# Patient Record
Sex: FEMALE | Race: WHITE | URBAN - NONMETROPOLITAN AREA
[De-identification: names, ages, dates, MRNs, and addresses within clinical notes are randomized per-mention and may not be internally consistent; named-entity substitution may affect disease eponyms.]

---

## 2017-08-09 ENCOUNTER — HOSPITAL ENCOUNTER (EMERGENCY)
Age: 27
Discharge: HOME | End: 2017-08-09
Attending: FAMILY MEDICINE
Payer: MEDICAID

## 2017-08-09 VITALS
RESPIRATION RATE: 14 BRPM | HEART RATE: 80 BPM | DIASTOLIC BLOOD PRESSURE: 88 MMHG | OXYGEN SATURATION: 98 % | WEIGHT: 121.69 LBS | TEMPERATURE: 98.7 F | SYSTOLIC BLOOD PRESSURE: 119 MMHG

## 2017-08-09 DIAGNOSIS — R11.2 NAUSEA AND VOMITING, INTRACTABILITY OF VOMITING NOT SPECIFIED, UNSPECIFIED VOMITING TYPE: Primary | ICD-10-CM

## 2017-08-09 DIAGNOSIS — R19.7 DIARRHEA, UNSPECIFIED TYPE: ICD-10-CM

## 2017-08-09 LAB
-: ABNORMAL
ABSOLUTE EOS #: 0.1 K/UL (ref 0–0.4)
ABSOLUTE LYMPH #: 1.3 K/UL (ref 1.1–2.7)
ABSOLUTE MONO #: 0.4 K/UL (ref 0–1)
ALBUMIN SERPL-MCNC: 4.2 G/DL (ref 3.5–5.2)
ALBUMIN/GLOBULIN RATIO: ABNORMAL (ref 1–2.5)
ALP BLD-CCNC: 82 U/L (ref 35–104)
ALT SERPL-CCNC: 16 U/L (ref 5–33)
AMORPHOUS: ABNORMAL
AMYLASE: 41 U/L (ref 28–100)
ANION GAP SERPL CALCULATED.3IONS-SCNC: 13 MMOL/L (ref 9–17)
AST SERPL-CCNC: 16 U/L
BACTERIA: ABNORMAL
BASOPHILS # BLD: 0 %
BASOPHILS ABSOLUTE: 0 K/UL (ref 0–0.2)
BILIRUB SERPL-MCNC: 0.79 MG/DL (ref 0.3–1.2)
BILIRUBIN URINE: NEGATIVE
BUN BLDV-MCNC: 12 MG/DL (ref 6–20)
BUN/CREAT BLD: 16 (ref 9–20)
CALCIUM SERPL-MCNC: 8.8 MG/DL (ref 8.6–10.4)
CASTS UA: ABNORMAL /LPF
CHLORIDE BLD-SCNC: 101 MMOL/L (ref 98–107)
CO2: 23 MMOL/L (ref 20–31)
COLOR: ABNORMAL
COMMENT UA: ABNORMAL
CREAT SERPL-MCNC: 0.73 MG/DL (ref 0.5–0.9)
CRYSTALS, UA: ABNORMAL /HPF
DIFFERENTIAL TYPE: YES
EOSINOPHILS RELATIVE PERCENT: 2 %
EPITHELIAL CELLS UA: ABNORMAL /HPF
GFR AFRICAN AMERICAN: >60 ML/MIN
GFR NON-AFRICAN AMERICAN: >60 ML/MIN
GFR SERPL CREATININE-BSD FRML MDRD: ABNORMAL ML/MIN/{1.73_M2}
GFR SERPL CREATININE-BSD FRML MDRD: ABNORMAL ML/MIN/{1.73_M2}
GLUCOSE BLD-MCNC: 225 MG/DL (ref 70–99)
GLUCOSE URINE: ABNORMAL
HCG(URINE) PREGNANCY TEST: NEGATIVE
HCT VFR BLD CALC: 41.1 % (ref 36–46)
HEMOGLOBIN: 14.1 G/DL (ref 12–16)
KETONES, URINE: NEGATIVE
LEUKOCYTE ESTERASE, URINE: NEGATIVE
LIPASE: 17 U/L (ref 13–60)
LYMPHOCYTES # BLD: 21 %
MCH RBC QN AUTO: 31.1 PG (ref 26–34)
MCHC RBC AUTO-ENTMCNC: 34.3 G/DL (ref 31–37)
MCV RBC AUTO: 90.5 FL (ref 80–100)
MONOCYTES # BLD: 6 %
MUCUS: ABNORMAL
NITRITE, URINE: NEGATIVE
OTHER OBSERVATIONS UA: ABNORMAL
PDW BLD-RTO: 12.9 % (ref 12.1–15.2)
PH UA: 6 (ref 5–8)
PLATELET # BLD: 248 K/UL (ref 140–450)
PLATELET ESTIMATE: NORMAL
PMV BLD AUTO: NORMAL FL (ref 6–12)
POTASSIUM SERPL-SCNC: 4.2 MMOL/L (ref 3.7–5.3)
PROTEIN UA: ABNORMAL
RBC # BLD: 4.55 M/UL (ref 4–5.2)
RBC # BLD: NORMAL 10*6/UL
RBC UA: ABNORMAL /HPF (ref 0–2)
RENAL EPITHELIAL, UA: ABNORMAL /HPF
SEG NEUTROPHILS: 71 %
SEGMENTED NEUTROPHILS ABSOLUTE COUNT: 4.6 K/UL (ref 2.5–7)
SODIUM BLD-SCNC: 137 MMOL/L (ref 135–144)
SPECIFIC GRAVITY UA: 1.01 (ref 1–1.03)
TOTAL PROTEIN: 7.2 G/DL (ref 6.4–8.3)
TRICHOMONAS: ABNORMAL
TURBIDITY: ABNORMAL
URINE HGB: NEGATIVE
UROBILINOGEN, URINE: NORMAL
WBC # BLD: 6.5 K/UL (ref 3.5–11)
WBC # BLD: NORMAL 10*3/UL
WBC UA: ABNORMAL /HPF
YEAST: ABNORMAL

## 2017-08-09 PROCEDURE — 2580000003 HC RX 258: Performed by: FAMILY MEDICINE

## 2017-08-09 PROCEDURE — 85025 COMPLETE CBC W/AUTO DIFF WBC: CPT

## 2017-08-09 PROCEDURE — 96374 THER/PROPH/DIAG INJ IV PUSH: CPT

## 2017-08-09 PROCEDURE — 81001 URINALYSIS AUTO W/SCOPE: CPT

## 2017-08-09 PROCEDURE — 6360000002 HC RX W HCPCS: Performed by: FAMILY MEDICINE

## 2017-08-09 PROCEDURE — 80053 COMPREHEN METABOLIC PANEL: CPT

## 2017-08-09 PROCEDURE — 84703 CHORIONIC GONADOTROPIN ASSAY: CPT

## 2017-08-09 PROCEDURE — 99283 EMERGENCY DEPT VISIT LOW MDM: CPT

## 2017-08-09 PROCEDURE — 83690 ASSAY OF LIPASE: CPT

## 2017-08-09 PROCEDURE — 82150 ASSAY OF AMYLASE: CPT

## 2017-08-09 PROCEDURE — 96361 HYDRATE IV INFUSION ADD-ON: CPT

## 2017-08-09 RX ORDER — LANCETS
1 EACH MISCELLANEOUS PRN
COMMUNITY
Start: 2015-02-04

## 2017-08-09 RX ORDER — LEVOTHYROXINE SODIUM 0.07 MG/1
75 TABLET ORAL DAILY
COMMUNITY

## 2017-08-09 RX ORDER — ELECTROLYTES/DEXTROSE
1 SOLUTION, ORAL ORAL DAILY
COMMUNITY

## 2017-08-09 RX ORDER — BENAZEPRIL HYDROCHLORIDE 5 MG/1
5 TABLET, FILM COATED ORAL DAILY
COMMUNITY
Start: 2016-11-10

## 2017-08-09 RX ORDER — 0.9 % SODIUM CHLORIDE 0.9 %
1000 INTRAVENOUS SOLUTION INTRAVENOUS ONCE
Status: COMPLETED | OUTPATIENT
Start: 2017-08-09 | End: 2017-08-09

## 2017-08-09 RX ORDER — ONDANSETRON 4 MG/1
4 TABLET, ORALLY DISINTEGRATING ORAL EVERY 8 HOURS PRN
Qty: 4 TABLET | Refills: 0 | Status: SHIPPED | OUTPATIENT
Start: 2017-08-09 | End: 2017-12-18 | Stop reason: ALTCHOICE

## 2017-08-09 RX ORDER — SERTRALINE HYDROCHLORIDE 100 MG/1
100 TABLET, FILM COATED ORAL DAILY
COMMUNITY

## 2017-08-09 RX ORDER — GLUCOSAMINE HCL/CHONDROITIN SU 500-400 MG
1 CAPSULE ORAL PRN
COMMUNITY
Start: 2015-02-04

## 2017-08-09 RX ORDER — ONDANSETRON 2 MG/ML
4 INJECTION INTRAMUSCULAR; INTRAVENOUS EVERY 30 MIN PRN
Status: DISCONTINUED | OUTPATIENT
Start: 2017-08-09 | End: 2017-08-09 | Stop reason: HOSPADM

## 2017-08-09 RX ADMIN — SODIUM CHLORIDE 1000 ML: 0.9 INJECTION, SOLUTION INTRAVENOUS at 14:46

## 2017-08-09 RX ADMIN — ONDANSETRON 4 MG: 2 INJECTION INTRAMUSCULAR; INTRAVENOUS at 14:49

## 2017-12-18 ENCOUNTER — HOSPITAL ENCOUNTER (EMERGENCY)
Age: 27
Discharge: HOME | End: 2017-12-18
Attending: FAMILY MEDICINE
Payer: MEDICAID

## 2017-12-18 VITALS
HEART RATE: 87 BPM | OXYGEN SATURATION: 99 % | TEMPERATURE: 100.1 F | RESPIRATION RATE: 20 BRPM | DIASTOLIC BLOOD PRESSURE: 67 MMHG | SYSTOLIC BLOOD PRESSURE: 120 MMHG

## 2017-12-18 DIAGNOSIS — J06.9 VIRAL UPPER RESPIRATORY TRACT INFECTION: Primary | ICD-10-CM

## 2017-12-18 LAB
DIRECT EXAM: NORMAL
Lab: NORMAL
Lab: NORMAL
SPECIMEN DESCRIPTION: NORMAL
SPECIMEN DESCRIPTION: NORMAL
STATUS: NORMAL
STATUS: NORMAL

## 2017-12-18 PROCEDURE — 99283 EMERGENCY DEPT VISIT LOW MDM: CPT

## 2017-12-18 PROCEDURE — 87804 INFLUENZA ASSAY W/OPTIC: CPT

## 2017-12-18 PROCEDURE — 87651 STREP A DNA AMP PROBE: CPT

## 2017-12-18 PROCEDURE — 6370000000 HC RX 637 (ALT 250 FOR IP): Performed by: FAMILY MEDICINE

## 2017-12-18 RX ORDER — BENZONATATE 100 MG/1
100 CAPSULE ORAL ONCE
Status: COMPLETED | OUTPATIENT
Start: 2017-12-18 | End: 2017-12-18

## 2017-12-18 RX ORDER — BENZONATATE 100 MG/1
100 CAPSULE ORAL EVERY 4 HOURS PRN
Qty: 30 CAPSULE | Refills: 0 | Status: SHIPPED | OUTPATIENT
Start: 2017-12-18 | End: 2017-12-25

## 2017-12-18 RX ADMIN — BENZONATATE 100 MG: 100 CAPSULE ORAL at 21:16

## 2017-12-18 ASSESSMENT — PAIN SCALES - GENERAL: PAINLEVEL_OUTOF10: 6

## 2017-12-20 LAB
DIRECT EXAM: NORMAL
DIRECT EXAM: NORMAL
Lab: NORMAL
SPECIMEN DESCRIPTION: NORMAL
SPECIMEN DESCRIPTION: NORMAL
STATUS: NORMAL

## 2017-12-23 NOTE — ED PROVIDER NOTES
eMERGENCY dEPARTMENT eNCOUnter        [address] [facility]    Chief Complaint   Patient presents with    Cough     past 6 days    Fever     started yesterday    Generalized Body Aches       HPI    March Byron is a 28 y.o. female who presents to ED from Home by private vehicle with . She has generalized body aches and a cough and a low-grade fever. She is not coughing up any sputum and she is not short of breath. She has no vomiting or diarrhea. REVIEW OF SYSTEMS    All systems reviewed and positives are in the HPI      PAST MEDICAL HISTORY    Past Medical History:   Diagnosis Date    Diabetes mellitus ([address]     Thyroid disease        SURGICAL HISTORY    History reviewed. No pertinent surgical history. CURRENT MEDICATIONS    Current Outpatient Rx   Medication Sig Dispense Refill    benzonatate (TESSALON PERLES) 100 MG capsule Take 1 capsule by mouth every 4 hours as needed for Cough 30 capsule 0    Insulin Infusion Pump FADI Inject 1 Device into the skin continuous      levothyroxine (SYNTHROID) 75 MCG tablet Take 75 mcg by mouth daily      Multiple Vitamins-Minerals (MULTIVITAMIN ADULT) TABS Take 1 tablet by mouth daily      sertraline (ZOLOFT) 100 MG tablet Take 100 mg by mouth daily      benazepril (LOTENSIN) 5 MG tablet Take 5 mg by mouth daily      insulin lispro (HUMALOG) 100 UNIT/ML injection vial Inject 100 Units into the skin daily       Omega-3 Fatty Acids (FISH OIL PO) Take 340 capsules by mouth daily      Daphne Lancet Super Thin 30G MISC Inject 1 kit into the skin as needed      Glucose Blood (BLOOD GLUCOSE TEST STRIPS) STRP Inject 1 strip into the skin as needed         ALLERGIES    No Known Allergies    FAMILY HISTORY    History reviewed. No pertinent family history.     SOCIAL HISTORY    Social History     Social History    Marital status: Single     Spouse name: N/A    Number of children: N/A    Years of education: N/A     Social History Main Topics    Smoking status: Never Smoker    Smokeless tobacco: Never Used    Alcohol use No    Drug use: No    Sexual activity: Not Asked     Other Topics Concern    None     Social History Narrative    None       PHYSICAL EXAM    VITAL SIGNS: /67   Pulse 87   Temp 100.1 °F (37.8 °C) (Oral)   Resp 20   LMP 12/11/2017   SpO2 99%   Constitutional:  Well developed, well nourished, no acute distress, non-toxic appearance   Eyes:  Pupils equally round and reactive to light. Extraocular motions intact. HENT: Normocephalic and atraumatic. External ears are normal.  TMs are normal.  The face and nose and mouth are normal.  There is no redness or exudate in the posterior pharynx. There is no lymphadenopathy undetectable. Respiratory:  No respiratory distress, normal breath sounds   Cardiovascular:  Normal rate, normal rhythm, no murmurs, no gallops, no rubs   Musculoskeletal:  No edema, no tenderness, no deformities. Back- no tenderness   Integument:  Well hydrated, no rash   Neurologic: Cranial nerves II through XII are intact. Motor and sensory function is normal.    EKG        RADIOLOGY  No orders to display       Labs  Labs Reviewed   RAPID INFLUENZA A/B ANTIGENS   STREP SCREEN GROUP A THROAT   STREP A DNA PROBE, AMPLIFICATION       PROCEDURES          Summation      Patient Course: 25-year-old female with upper respiratory symptoms. Negative strep and flu test.  Exam was normal.  She is discharged home with cough medication.     ED Medications administered this visit:    Medications   benzonatate (TESSALON) capsule 100 mg (100 mg Oral Given 12/18/17 2116)       New Prescriptions from this visit:    Discharge Medication List as of 12/18/2017  9:15 PM      START taking these medications    Details   benzonatate (TESSALON PERLES) 100 MG capsule Take 1 capsule by mouth every 4 hours as needed for Cough, Disp-30 capsule, R-0Print             Follow-up:  Deb Suresh  [address] [facility] [id number]              Final Impression:   1.  Viral upper respiratory tract infection               (Please note that portions of this note were completed with a voice recognition program.  Efforts were made to edit the dictations but occasionally words are mis-transcribed.)        Dave Smith MD  12/22/17 2056

## 2024-03-12 ENCOUNTER — HOSPITAL ENCOUNTER (EMERGENCY)
Age: 34
Discharge: HOME | End: 2024-03-12
Attending: EMERGENCY MEDICINE

## 2024-03-12 VITALS
SYSTOLIC BLOOD PRESSURE: 152 MMHG | RESPIRATION RATE: 18 BRPM | HEIGHT: 62 IN | DIASTOLIC BLOOD PRESSURE: 84 MMHG | OXYGEN SATURATION: 99 % | TEMPERATURE: 97.9 F | HEART RATE: 71 BPM | BODY MASS INDEX: 26.31 KG/M2 | WEIGHT: 143 LBS

## 2024-03-12 DIAGNOSIS — R60.0 LEG EDEMA: Primary | ICD-10-CM

## 2024-03-12 LAB
ANION GAP SERPL CALCULATED.3IONS-SCNC: 11 MMOL/L (ref 9–17)
BUN SERPL-MCNC: 12 MG/DL (ref 6–20)
BUN/CREAT SERPL: 20 (ref 9–20)
CALCIUM SERPL-MCNC: 9.2 MG/DL (ref 8.6–10.4)
CHLORIDE SERPL-SCNC: 102 MMOL/L (ref 98–107)
CO2 SERPL-SCNC: 25 MMOL/L (ref 20–31)
CREAT SERPL-MCNC: 0.6 MG/DL (ref 0.5–0.9)
GFR SERPL CREATININE-BSD FRML MDRD: >60 ML/MIN/1.73M2
GLUCOSE SERPL-MCNC: 254 MG/DL (ref 70–99)
POTASSIUM SERPL-SCNC: 4 MMOL/L (ref 3.7–5.3)
SODIUM SERPL-SCNC: 138 MMOL/L (ref 135–144)

## 2024-03-12 PROCEDURE — 36415 COLL VENOUS BLD VENIPUNCTURE: CPT

## 2024-03-12 PROCEDURE — 99283 EMERGENCY DEPT VISIT LOW MDM: CPT

## 2024-03-12 PROCEDURE — 80048 BASIC METABOLIC PNL TOTAL CA: CPT

## 2024-03-12 RX ORDER — AMOXICILLIN 500 MG/1
500 CAPSULE ORAL 2 TIMES DAILY
Qty: 20 CAPSULE | Refills: 0 | Status: SHIPPED | OUTPATIENT
Start: 2024-03-12 | End: 2024-03-22

## 2024-03-12 ASSESSMENT — ENCOUNTER SYMPTOMS
WHEEZING: 0
BACK PAIN: 0
CHEST TIGHTNESS: 0

## 2024-03-12 ASSESSMENT — PAIN - FUNCTIONAL ASSESSMENT: PAIN_FUNCTIONAL_ASSESSMENT: NONE - DENIES PAIN

## 2024-03-12 ASSESSMENT — LIFESTYLE VARIABLES
HOW MANY STANDARD DRINKS CONTAINING ALCOHOL DO YOU HAVE ON A TYPICAL DAY: PATIENT DOES NOT DRINK
HOW OFTEN DO YOU HAVE A DRINK CONTAINING ALCOHOL: NEVER

## 2024-03-12 NOTE — ED PROVIDER NOTES
[facility]  EMERGENCY DEPARTMENT ENCOUNTER      Pt Name: Keena Kumar  MRN: [id number]  Birthdate 1990  Date of evaluation: 3/12/2024  Provider: Rickey Duckworth MD    CHIEF COMPLAINT       Chief Complaint   Patient presents with    Leg Swelling     Pt states she had a tooth extracted 1 week ago and has been taking a lot of Tylenol and Ibuprofen, concerned now that her ankles are swollen         HISTORY OF PRESENT ILLNESS      Keena Kumar is a 33 y.o. female who presents to the emergency department for leg swelling after taking a lot of tylenol and motrin after a dental extraction last week. She states she is concerned she has kidney damage she has tpye 1 dm. No fevers no NVD. She uses a pump but not a dexcom. She has no fevers no change in urination. No blood in urine.          REVIEW OF SYSTEMS       Review of Systems   Constitutional:  Negative for activity change.   HENT:  Positive for dental problem. Negative for congestion.    Respiratory:  Negative for chest tightness and wheezing.    Cardiovascular:  Positive for leg swelling.   Musculoskeletal:  Negative for back pain and gait problem.   Neurological:  Negative for weakness and light-headedness.         PAST MEDICAL HISTORY     Past Medical History:   Diagnosis Date    Diabetes mellitus (HCC)     Thyroid disease          SURGICAL HISTORY       History reviewed. No pertinent surgical history.      CURRENT MEDICATIONS       Previous Medications    ZOË LANCET SUPER THIN 30G MISC    Inject 1 kit into the skin as needed    BENAZEPRIL (LOTENSIN) 5 MG TABLET    Take 5 mg by mouth daily    GLUCOSE BLOOD (BLOOD GLUCOSE TEST STRIPS) STRP    Inject 1 strip into the skin as needed    INSULIN INFUSION PUMP FADI    Inject 1 Device into the skin continuous    INSULIN LISPRO (HUMALOG) 100 UNIT/ML INJECTION VIAL    Inject 100 Units into the skin daily Indications: uses a sliding scale    LEVOTHYROXINE (SYNTHROID) 75 MCG TABLET    Take 1 tablet by mouth

## 2024-09-27 ENCOUNTER — OFFICE VISIT (OUTPATIENT)
Dept: FAMILY MEDICINE CLINIC | Age: 34
End: 2024-09-27

## 2024-09-27 ENCOUNTER — HOSPITAL ENCOUNTER (OUTPATIENT)
Age: 34
Discharge: HOME | End: 2024-09-27
Payer: COMMERCIAL

## 2024-09-27 VITALS
BODY MASS INDEX: 25.06 KG/M2 | HEART RATE: 70 BPM | WEIGHT: 137 LBS | SYSTOLIC BLOOD PRESSURE: 110 MMHG | DIASTOLIC BLOOD PRESSURE: 80 MMHG | OXYGEN SATURATION: 98 %

## 2024-09-27 DIAGNOSIS — Z00.00 ENCOUNTER FOR MEDICAL EXAMINATION TO ESTABLISH CARE: Primary | ICD-10-CM

## 2024-09-27 DIAGNOSIS — E10.43 TYPE 1 DIABETES MELLITUS WITH DIABETIC AUTONOMIC NEUROPATHY (HCC): ICD-10-CM

## 2024-09-27 DIAGNOSIS — E03.9 ACQUIRED HYPOTHYROIDISM: ICD-10-CM

## 2024-09-27 DIAGNOSIS — G47.09 OTHER INSOMNIA: ICD-10-CM

## 2024-09-27 PROBLEM — F41.9 ANXIETY: Status: RESOLVED | Noted: 2024-09-27 | Resolved: 2024-09-27

## 2024-09-27 PROBLEM — F41.9 ANXIETY: Status: ACTIVE | Noted: 2024-09-27

## 2024-09-27 PROBLEM — G47.00 INSOMNIA: Status: ACTIVE | Noted: 2024-09-27

## 2024-09-27 LAB
HBA1C MFR BLD: 10.6 %
TSH SERPL DL<=0.05 MIU/L-ACNC: 0.95 UIU/ML (ref 0.3–5)

## 2024-09-27 PROCEDURE — 84443 ASSAY THYROID STIM HORMONE: CPT

## 2024-09-27 PROCEDURE — 36415 COLL VENOUS BLD VENIPUNCTURE: CPT

## 2024-09-27 RX ORDER — GLUCAGON 3 MG/1
1 POWDER NASAL PRN
COMMUNITY
Start: 2024-07-30

## 2024-09-27 RX ORDER — LEVOTHYROXINE SODIUM 125 UG/1
125 TABLET ORAL DAILY
COMMUNITY
Start: 2024-09-14

## 2024-09-27 SDOH — ECONOMIC STABILITY: FOOD INSECURITY: WITHIN THE PAST 12 MONTHS, THE FOOD YOU BOUGHT JUST DIDN'T LAST AND YOU DIDN'T HAVE MONEY TO GET MORE.: NEVER TRUE

## 2024-09-27 SDOH — ECONOMIC STABILITY: FOOD INSECURITY: WITHIN THE PAST 12 MONTHS, YOU WORRIED THAT YOUR FOOD WOULD RUN OUT BEFORE YOU GOT MONEY TO BUY MORE.: NEVER TRUE

## 2024-09-27 SDOH — ECONOMIC STABILITY: INCOME INSECURITY: HOW HARD IS IT FOR YOU TO PAY FOR THE VERY BASICS LIKE FOOD, HOUSING, MEDICAL CARE, AND HEATING?: NOT HARD AT ALL

## 2024-09-27 ASSESSMENT — ENCOUNTER SYMPTOMS
VOMITING: 0
WHEEZING: 0
ABDOMINAL PAIN: 0
COUGH: 0
RHINORRHEA: 0
NAUSEA: 0
DIARRHEA: 0
SINUS PAIN: 0
BACK PAIN: 0

## 2024-09-27 ASSESSMENT — PATIENT HEALTH QUESTIONNAIRE - PHQ9
SUM OF ALL RESPONSES TO PHQ QUESTIONS 1-9: 2
2. FEELING DOWN, DEPRESSED OR HOPELESS: SEVERAL DAYS
SUM OF ALL RESPONSES TO PHQ QUESTIONS 1-9: 2
1. LITTLE INTEREST OR PLEASURE IN DOING THINGS: SEVERAL DAYS
SUM OF ALL RESPONSES TO PHQ9 QUESTIONS 1 & 2: 2
SUM OF ALL RESPONSES TO PHQ QUESTIONS 1-9: 2
SUM OF ALL RESPONSES TO PHQ QUESTIONS 1-9: 2

## 2024-11-18 ENCOUNTER — OFFICE VISIT (OUTPATIENT)
Dept: FAMILY MEDICINE CLINIC | Age: 34
End: 2024-11-18
Payer: COMMERCIAL

## 2024-11-18 VITALS
BODY MASS INDEX: 25.06 KG/M2 | DIASTOLIC BLOOD PRESSURE: 80 MMHG | OXYGEN SATURATION: 98 % | WEIGHT: 137 LBS | SYSTOLIC BLOOD PRESSURE: 136 MMHG | HEART RATE: 78 BPM

## 2024-11-18 DIAGNOSIS — G47.09 OTHER INSOMNIA: Primary | ICD-10-CM

## 2024-11-18 PROCEDURE — 99213 OFFICE O/P EST LOW 20 MIN: CPT | Performed by: STUDENT IN AN ORGANIZED HEALTH CARE EDUCATION/TRAINING PROGRAM

## 2024-11-18 RX ORDER — HYDROXYZINE HYDROCHLORIDE 50 MG/1
TABLET, FILM COATED ORAL
Qty: 60 TABLET | Refills: 2 | Status: SHIPPED | OUTPATIENT
Start: 2024-11-18

## 2024-11-18 ASSESSMENT — ENCOUNTER SYMPTOMS
BACK PAIN: 0
SINUS PAIN: 0
RHINORRHEA: 0
WHEEZING: 0
NAUSEA: 0
DIARRHEA: 0
VOMITING: 0
COUGH: 0
ABDOMINAL PAIN: 0

## 2024-11-18 NOTE — PROGRESS NOTES
HPI Notes    Name: Keena Kumar  : 1990         Chief Complaint:     Chief Complaint   Patient presents with    Insomnia     Follow up. Patient tried valerian root with no improvement.        History of Present Illness:        HPI    This is a 34 year old woman presenting for reevaluation of sleep maintenance insomnia. I had recommended OTC valerian root extract QHS, which she has tried with little to no improvement.     Past Medical History:     Past Medical History:   Diagnosis Date    Diabetes mellitus (HCC)     Thyroid disease       Reviewed all health maintenance requirements and ordered appropriate tests  Health Maintenance Due   Topic Date Due    Pneumococcal 0-64 years Vaccine (1 of 2 - PCV) Never done    Diabetic foot exam  Never done    Varicella vaccine (1 of 2 - 13+ 2-dose series) Never done    HIV screen  Never done    Diabetic retinal exam  Never done    Hepatitis C screen  Never done    Diabetic Alb to Cr ratio (uACR) test  2016    Flu vaccine (1) Never done    COVID-19 Vaccine ( season) Never done       Past Surgical History:     No past surgical history on file.     Medications:       Prior to Admission medications    Medication Sig Start Date End Date Taking? Authorizing Provider   hydrOXYzine HCl (ATARAX) 50 MG tablet Take 1-2 tablets PO QHS PRN for sleep 24  Yes Pardeep Mtz DO   BAQSIMI ONE PACK 3 MG/DOSE POWD 1 each by Nasal route as needed 24  Yes ProviderFemi MD   levothyroxine (SYNTHROID) 125 MCG tablet Take 1 tablet by mouth daily 24  Yes Femi Kim MD   sertraline (ZOLOFT) 100 MG tablet Take 1 tablet by mouth daily   Yes ProviderFemi MD   benazepril (LOTENSIN) 5 MG tablet Take 1 tablet by mouth daily 11/10/16  Yes ProviderFemi MD   insulin lispro (HUMALOG) 100 UNIT/ML injection vial Inject 100 Units into the skin daily Indications: uses a sliding scale 11/23/15  Yes ProviderFemi MD   Omega-3

## 2024-11-18 NOTE — PATIENT INSTRUCTIONS
SURVEY:    You may be receiving a survey from [facility] your [facility] today.    You may get this in the mail, through your MyChart or in your email.     Please complete the survey to enable us to provide the highest quality of care to you and your family.    If you cannot score us as very good ( 5 Stars) on any question, please feel free to call the office to discuss how we could have made your experience exceptional.     Thank you.    Clinical Care Team:  Dr. Pardeep Mtz, DO Christine Portillo LPN    Triage:  Jeanette Pepper CMA    Clerical Team:  Jeanette Dave

## 2025-03-11 ENCOUNTER — OFFICE VISIT (OUTPATIENT)
Dept: FAMILY MEDICINE CLINIC | Age: 35
End: 2025-03-11
Payer: COMMERCIAL

## 2025-03-11 ENCOUNTER — PATIENT MESSAGE (OUTPATIENT)
Dept: FAMILY MEDICINE CLINIC | Age: 35
End: 2025-03-11

## 2025-03-11 VITALS — WEIGHT: 141 LBS | BODY MASS INDEX: 25.79 KG/M2

## 2025-03-11 DIAGNOSIS — K30 STOMACH UPSET: Primary | ICD-10-CM

## 2025-03-11 DIAGNOSIS — E10.43 TYPE 1 DIABETES MELLITUS WITH DIABETIC AUTONOMIC NEUROPATHY (HCC): ICD-10-CM

## 2025-03-11 DIAGNOSIS — G44.83 COUGH HEADACHE: ICD-10-CM

## 2025-03-11 DIAGNOSIS — J00 COLD VIRUS: ICD-10-CM

## 2025-03-11 DIAGNOSIS — R09.82 POSTNASAL DRIP: ICD-10-CM

## 2025-03-11 LAB
HBA1C MFR BLD: 9.9 %
HCG, URINE, POC: NEGATIVE
INFLUENZA A ANTIGEN, POC: NEGATIVE
INFLUENZA B ANTIGEN, POC: NEGATIVE
LOT NUMBER POC: NORMAL
Lab: NORMAL
NEGATIVE QC PASS/FAIL: NORMAL
POSITIVE QC PASS/FAIL: NORMAL
SARS-COV-2 RNA POC - COV: NORMAL
VALID INTERNAL CONTROL, POC: PRESENT
VENDOR AND KIT NAME POC: NORMAL

## 2025-03-11 PROCEDURE — 81025 URINE PREGNANCY TEST: CPT | Performed by: STUDENT IN AN ORGANIZED HEALTH CARE EDUCATION/TRAINING PROGRAM

## 2025-03-11 PROCEDURE — 99214 OFFICE O/P EST MOD 30 MIN: CPT | Performed by: STUDENT IN AN ORGANIZED HEALTH CARE EDUCATION/TRAINING PROGRAM

## 2025-03-11 PROCEDURE — 83036 HEMOGLOBIN GLYCOSYLATED A1C: CPT | Performed by: STUDENT IN AN ORGANIZED HEALTH CARE EDUCATION/TRAINING PROGRAM

## 2025-03-11 SDOH — ECONOMIC STABILITY: FOOD INSECURITY: WITHIN THE PAST 12 MONTHS, YOU WORRIED THAT YOUR FOOD WOULD RUN OUT BEFORE YOU GOT MONEY TO BUY MORE.: NEVER TRUE

## 2025-03-11 SDOH — ECONOMIC STABILITY: FOOD INSECURITY: WITHIN THE PAST 12 MONTHS, THE FOOD YOU BOUGHT JUST DIDN'T LAST AND YOU DIDN'T HAVE MONEY TO GET MORE.: NEVER TRUE

## 2025-03-11 ASSESSMENT — PATIENT HEALTH QUESTIONNAIRE - PHQ9
SUM OF ALL RESPONSES TO PHQ QUESTIONS 1-9: 0
SUM OF ALL RESPONSES TO PHQ QUESTIONS 1-9: 0
1. LITTLE INTEREST OR PLEASURE IN DOING THINGS: NOT AT ALL
SUM OF ALL RESPONSES TO PHQ QUESTIONS 1-9: 0
2. FEELING DOWN, DEPRESSED OR HOPELESS: NOT AT ALL
SUM OF ALL RESPONSES TO PHQ QUESTIONS 1-9: 0

## 2025-03-11 ASSESSMENT — ENCOUNTER SYMPTOMS
WHEEZING: 0
BACK PAIN: 0
RHINORRHEA: 0
DIARRHEA: 0
ABDOMINAL PAIN: 1
NAUSEA: 1
SINUS PAIN: 0
COUGH: 1
VOMITING: 0

## 2025-03-11 NOTE — PROGRESS NOTES
HPI Notes    Name: Keena Kumar  : 1990         Chief Complaint:     Chief Complaint   Patient presents with    Cold Symptoms     Cough, congestion, headache, stomach upset started 6 days ago.        History of Present Illness:        HPI    This is a 34-year-old woman with type 1 diabetes presenting for evaluation of some nonspecific symptoms including cough, nasal congestion, headache, stomach upset.  Symptoms began about 6 days ago, shortly after having outdoor pictures for her wedding. She has recently missed her menstrual period as well, which was supposed to start yesterday.     Regarding her diabetes, she is recently seen her endocrine specialist, whose note I did have an opportunity to review.  It appears that she had not been logging her carbohydrates or her blood sugar and did not do her laboratory studies recommended by her endocrine specialist.  My previous A1c was 10.6% six months ago.     Past Medical History:     Past Medical History:   Diagnosis Date    Diabetes mellitus (HCC)     Thyroid disease       Reviewed all health maintenance requirements and ordered appropriate tests  Health Maintenance Due   Topic Date Due    Diabetic foot exam  Never done    Varicella vaccine (1 of 2 - 13+ 2-dose series) Never done    HIV screen  Never done    Diabetic retinal exam  Never done    Hepatitis C screen  Never done    Pneumococcal 0-49 years Vaccine (1 of 2 - PCV) Never done    Diabetic Alb to Cr ratio (uACR) test  2016    Flu vaccine (1) Never done    COVID-19 Vaccine ( - - season) Never done    A1C test (Diabetic or Prediabetic)  2024    GFR test (Diabetes, CKD 3-4, OR last GFR 15-59)  2025       Past Surgical History:     No past surgical history on file.     Medications:       Prior to Admission medications    Medication Sig Start Date End Date Taking? Authorizing Provider   hydrOXYzine HCl (ATARAX) 50 MG tablet Take 1-2 tablets PO QHS PRN for sleep 24   Smith

## 2025-03-17 PROBLEM — F32.A DEPRESSION: Status: ACTIVE | Noted: 2025-03-17

## 2025-04-08 ENCOUNTER — PATIENT MESSAGE (OUTPATIENT)
Dept: FAMILY MEDICINE CLINIC | Age: 35
End: 2025-04-08

## 2025-04-23 ENCOUNTER — OFFICE VISIT (OUTPATIENT)
Dept: FAMILY MEDICINE CLINIC | Age: 35
End: 2025-04-23
Payer: COMMERCIAL

## 2025-04-23 VITALS
OXYGEN SATURATION: 98 % | SYSTOLIC BLOOD PRESSURE: 118 MMHG | HEART RATE: 78 BPM | DIASTOLIC BLOOD PRESSURE: 70 MMHG | WEIGHT: 138 LBS | HEIGHT: 62 IN | BODY MASS INDEX: 25.4 KG/M2

## 2025-04-23 DIAGNOSIS — F33.0 MILD EPISODE OF RECURRENT MAJOR DEPRESSIVE DISORDER: Primary | ICD-10-CM

## 2025-04-23 PROCEDURE — 99213 OFFICE O/P EST LOW 20 MIN: CPT | Performed by: STUDENT IN AN ORGANIZED HEALTH CARE EDUCATION/TRAINING PROGRAM

## 2025-04-23 ASSESSMENT — ENCOUNTER SYMPTOMS
DIARRHEA: 0
COUGH: 0
RHINORRHEA: 0
SINUS PAIN: 0
WHEEZING: 0
NAUSEA: 0
ABDOMINAL PAIN: 0
VOMITING: 0
BACK PAIN: 0

## 2025-04-23 NOTE — PATIENT INSTRUCTIONS
SURVEY:    You may be receiving a survey from [facility] your [facility] today.    You may get this in the mail, through your MyChart or in your email.     Please complete the survey to enable us to provide the highest quality of care to you and your family.    If you cannot score us as very good ( 5 Stars) on any question, please feel free to call the office to discuss how we could have made your experience exceptional.     Thank you.    Clinical Care Team:  Dr. Pardeep Mtz, DO Christine Portillo LPN    Triage:  Jeanette Pepper CMA    Clerical Team:  Jeanette Dave

## 2025-04-23 NOTE — PROGRESS NOTES
HPI Notes    Name: Keena Kumar  : 1990         Chief Complaint:     Chief Complaint   Patient presents with    Mental Health Problem     Patient would like to discuss restarting medication due to increase in anxiety and depression.        History of Present Illness:      HPI    This is a 34 year old woman presenting for reevaluation of her mental health. She had self-discontinued her Zoloft some time ago as she was concerned it would prevent her from conceiving a pregnancy. Subsequently she has been quite depressed since then.     Past Medical History:     Past Medical History:   Diagnosis Date    Diabetes mellitus (HCC)     Thyroid disease       Reviewed all health maintenance requirements and ordered appropriate tests  Health Maintenance Due   Topic Date Due    Diabetic foot exam  Never done    Varicella vaccine (1 of 2 - 13+ 2-dose series) Never done    HIV screen  Never done    Diabetic retinal exam  Never done    Hepatitis C screen  Never done    Pneumococcal 0-49 years Vaccine (1 of 2 - PCV) Never done    Diabetic Alb to Cr ratio (uACR) test  2016    COVID-19 Vaccine (2024- season) Never done    GFR test (Diabetes, CKD 3-4, OR last GFR 15-59)  2025       Past Surgical History:     No past surgical history on file.     Medications:       Prior to Admission medications    Medication Sig Start Date End Date Taking? Authorizing Provider   sertraline (ZOLOFT) 50 MG tablet Take one tablet PO QD x7 days then take two tablets PO QD after that 25  Yes Pardeep Mtz DO   hydrOXYzine HCl (ATARAX) 50 MG tablet Take 1-2 tablets PO QHS PRN for sleep 24   Pardeep Mtz DO   BAQSIMI ONE PACK 3 MG/DOSE POWD 1 each by Nasal route as needed 24   Femi Kim MD   levothyroxine (SYNTHROID) 125 MCG tablet Take 1 tablet by mouth daily 24   Femi Kim MD   Insulin Infusion Pump FADI Inject 1 Device into the skin continuous    Femi Kim MD